# Patient Record
Sex: MALE | Race: WHITE | NOT HISPANIC OR LATINO | URBAN - METROPOLITAN AREA
[De-identification: names, ages, dates, MRNs, and addresses within clinical notes are randomized per-mention and may not be internally consistent; named-entity substitution may affect disease eponyms.]

---

## 2017-07-17 ENCOUNTER — EMERGENCY (EMERGENCY)
Facility: HOSPITAL | Age: 22
LOS: 1 days | Discharge: HOME | End: 2017-07-17
Attending: EMERGENCY MEDICINE | Admitting: EMERGENCY MEDICINE
Payer: COMMERCIAL

## 2017-07-17 VITALS
OXYGEN SATURATION: 100 % | TEMPERATURE: 98 F | DIASTOLIC BLOOD PRESSURE: 83 MMHG | RESPIRATION RATE: 18 BRPM | SYSTOLIC BLOOD PRESSURE: 127 MMHG | HEART RATE: 84 BPM

## 2017-07-17 DIAGNOSIS — G43.909 MIGRAINE, UNSPECIFIED, NOT INTRACTABLE, WITHOUT STATUS MIGRAINOSUS: ICD-10-CM

## 2017-07-17 DIAGNOSIS — R51 HEADACHE: ICD-10-CM

## 2017-07-17 LAB
ALBUMIN SERPL ELPH-MCNC: 4.5 G/DL — SIGNIFICANT CHANGE UP (ref 3.4–5)
ALP SERPL-CCNC: 60 U/L — SIGNIFICANT CHANGE UP (ref 40–120)
ALT FLD-CCNC: 26 U/L — SIGNIFICANT CHANGE UP (ref 12–42)
ANION GAP SERPL CALC-SCNC: 6 MMOL/L — LOW (ref 9–16)
APPEARANCE UR: CLEAR — SIGNIFICANT CHANGE UP
AST SERPL-CCNC: 17 U/L — SIGNIFICANT CHANGE UP (ref 15–37)
BASOPHILS NFR BLD AUTO: 0.3 % — SIGNIFICANT CHANGE UP (ref 0–2)
BILIRUB SERPL-MCNC: 0.7 MG/DL — SIGNIFICANT CHANGE UP (ref 0.2–1.2)
BILIRUB UR-MCNC: NEGATIVE — SIGNIFICANT CHANGE UP
BUN SERPL-MCNC: 9 MG/DL — SIGNIFICANT CHANGE UP (ref 7–23)
CALCIUM SERPL-MCNC: 9.7 MG/DL — SIGNIFICANT CHANGE UP (ref 8.5–10.5)
CHLORIDE SERPL-SCNC: 105 MMOL/L — SIGNIFICANT CHANGE UP (ref 96–108)
CO2 SERPL-SCNC: 28 MMOL/L — SIGNIFICANT CHANGE UP (ref 22–31)
COLOR SPEC: YELLOW — SIGNIFICANT CHANGE UP
CREAT SERPL-MCNC: 0.89 MG/DL — SIGNIFICANT CHANGE UP (ref 0.5–1.3)
DIFF PNL FLD: NEGATIVE — SIGNIFICANT CHANGE UP
EOSINOPHIL NFR BLD AUTO: 0.6 % — SIGNIFICANT CHANGE UP (ref 0–6)
FLUAV SPEC QL CULT: NEGATIVE — SIGNIFICANT CHANGE UP
FLUBV AG SPEC QL IA: NEGATIVE — SIGNIFICANT CHANGE UP
GLUCOSE SERPL-MCNC: 101 MG/DL — HIGH (ref 70–99)
GLUCOSE UR QL: NEGATIVE — SIGNIFICANT CHANGE UP
HCT VFR BLD CALC: 43.2 % — SIGNIFICANT CHANGE UP (ref 39–50)
HGB BLD-MCNC: 16 G/DL — SIGNIFICANT CHANGE UP (ref 13–17)
IMM GRANULOCYTES NFR BLD AUTO: 0.4 % — SIGNIFICANT CHANGE UP (ref 0–1.5)
KETONES UR-MCNC: (no result) MG/DL
LEUKOCYTE ESTERASE UR-ACNC: NEGATIVE — SIGNIFICANT CHANGE UP
LYMPHOCYTES # BLD AUTO: 13.7 % — SIGNIFICANT CHANGE UP (ref 13–44)
MAGNESIUM SERPL-MCNC: 1.8 MG/DL — SIGNIFICANT CHANGE UP (ref 1.6–2.6)
MCHC RBC-ENTMCNC: 32.9 PG — SIGNIFICANT CHANGE UP (ref 27–34)
MCHC RBC-ENTMCNC: 37 G/DL — HIGH (ref 32–36)
MCV RBC AUTO: 88.7 FL — SIGNIFICANT CHANGE UP (ref 80–100)
MONOCYTES NFR BLD AUTO: 8.3 % — SIGNIFICANT CHANGE UP (ref 2–14)
NEUTROPHILS NFR BLD AUTO: 76.7 % — SIGNIFICANT CHANGE UP (ref 43–77)
NITRITE UR-MCNC: NEGATIVE — SIGNIFICANT CHANGE UP
PH UR: 7 — SIGNIFICANT CHANGE UP (ref 5–8)
PLATELET # BLD AUTO: 162 K/UL — SIGNIFICANT CHANGE UP (ref 150–400)
POTASSIUM SERPL-MCNC: 3.5 MMOL/L — SIGNIFICANT CHANGE UP (ref 3.5–5.3)
POTASSIUM SERPL-SCNC: 3.5 MMOL/L — SIGNIFICANT CHANGE UP (ref 3.5–5.3)
PROT SERPL-MCNC: 8.1 G/DL — SIGNIFICANT CHANGE UP (ref 6.4–8.2)
PROT UR-MCNC: NEGATIVE MG/DL — SIGNIFICANT CHANGE UP
RBC # BLD: 4.87 M/UL — SIGNIFICANT CHANGE UP (ref 4.2–5.8)
RBC # FLD: 11.9 % — SIGNIFICANT CHANGE UP (ref 10.3–16.9)
SODIUM SERPL-SCNC: 139 MMOL/L — SIGNIFICANT CHANGE UP (ref 132–145)
SP GR SPEC: 1.02 — SIGNIFICANT CHANGE UP (ref 1–1.03)
UROBILINOGEN FLD QL: 1 E.U./DL — SIGNIFICANT CHANGE UP
WBC # BLD: 11.7 K/UL — HIGH (ref 3.8–10.5)
WBC # FLD AUTO: 11.7 K/UL — HIGH (ref 3.8–10.5)

## 2017-07-17 PROCEDURE — 70450 CT HEAD/BRAIN W/O DYE: CPT | Mod: 26

## 2017-07-17 PROCEDURE — 99285 EMERGENCY DEPT VISIT HI MDM: CPT

## 2017-07-17 RX ORDER — ONDANSETRON 8 MG/1
4 TABLET, FILM COATED ORAL ONCE
Qty: 0 | Refills: 0 | Status: COMPLETED | OUTPATIENT
Start: 2017-07-17 | End: 2017-07-17

## 2017-07-17 RX ORDER — ONDANSETRON 8 MG/1
1 TABLET, FILM COATED ORAL
Qty: 30 | Refills: 0 | OUTPATIENT
Start: 2017-07-17

## 2017-07-17 RX ORDER — KETOROLAC TROMETHAMINE 30 MG/ML
30 SYRINGE (ML) INJECTION ONCE
Qty: 0 | Refills: 0 | Status: DISCONTINUED | OUTPATIENT
Start: 2017-07-17 | End: 2017-07-17

## 2017-07-17 RX ORDER — METOCLOPRAMIDE HCL 10 MG
1 TABLET ORAL
Qty: 30 | Refills: 0 | OUTPATIENT
Start: 2017-07-17

## 2017-07-17 RX ORDER — DEXAMETHASONE 0.5 MG/5ML
10 ELIXIR ORAL ONCE
Qty: 0 | Refills: 0 | Status: COMPLETED | OUTPATIENT
Start: 2017-07-17 | End: 2017-07-17

## 2017-07-17 RX ORDER — SODIUM CHLORIDE 9 MG/ML
2000 INJECTION INTRAMUSCULAR; INTRAVENOUS; SUBCUTANEOUS ONCE
Qty: 0 | Refills: 0 | Status: COMPLETED | OUTPATIENT
Start: 2017-07-17 | End: 2017-07-17

## 2017-07-17 RX ORDER — METOCLOPRAMIDE HCL 10 MG
10 TABLET ORAL ONCE
Qty: 0 | Refills: 0 | Status: COMPLETED | OUTPATIENT
Start: 2017-07-17 | End: 2017-07-17

## 2017-07-17 RX ADMIN — Medication 10 MILLIGRAM(S): at 14:33

## 2017-07-17 RX ADMIN — ONDANSETRON 4 MILLIGRAM(S): 8 TABLET, FILM COATED ORAL at 14:33

## 2017-07-17 RX ADMIN — SODIUM CHLORIDE 2000 MILLILITER(S): 9 INJECTION INTRAMUSCULAR; INTRAVENOUS; SUBCUTANEOUS at 15:37

## 2017-07-17 NOTE — ED ADULT TRIAGE NOTE - CHIEF COMPLAINT QUOTE
Pt sent from [facility] for "worst headache of his life." Pt does have history of migraines but has not had one in 10 years. Pt is sensitive to light and complaining of feelings of nausea

## 2017-07-17 NOTE — ED PROVIDER NOTE - OBJECTIVE STATEMENT
23 yo M with a hx of migraines comes from work to the ED c/o migraine since yesterday. Pt states he woke up to a migraine and nausea yesterday morning. Took Excedrin like he normally does when he has a migraine and felt better for a couple of hours. Went to bed and woke up today with worse migraine. Pt states his usual migraines comes with N/V, light sensitivity and b/l throbbing HA at the temples of the head. Pt states he is experiencing all those symptoms, and has blurry vision in b/l eyes with this episode. Describes blurry vision as "fuzzy patches." Denies having blurry vision with past migraine epsiodes. Notes he had not eaten much, but had vomiting 3x this morning. States this episode feels worse and longer in duration than usual. Denies heavy drinking this past weekend and denies any caffeine intake. Pt states he lives on [address], and does not live near [facility]. Denies hiking this summer. No sick contacts. Pt's uncle has hx of aneurysm.

## 2017-07-17 NOTE — ED ADULT NURSE NOTE - OBJECTIVE STATEMENT
Pt sent from [facility] for h/a w/ n/v since yesterday and today w/ visual disturbance. States stopped drinking etoh 1 mth ago. Axox3 and denies any cp, sob, but appears in pain.

## 2017-07-17 NOTE — ED PROVIDER NOTE - CONSTITUTIONAL, MLM
normal... Awake, alert, oriented to person, place, time/situation and in mild and moderate distress. Pt is lying down with eyes closed in a dark room

## 2017-07-17 NOTE — ED PROVIDER NOTE - PROGRESS NOTE DETAILS
As per nurse, pt is feeling much better and symptom has improved. Pt's sx have resolved and he would like to go, will d/c. labs and CT head wnl. Will d/c with Reglan Rx.

## 2017-07-17 NOTE — ED PROVIDER NOTE - MEDICAL DECISION MAKING DETAILS
Patient presenting with headache most consistent with migraine- n/v maybe be related or parallel AGE. Will check labs and CT head and send Lyme.  Migraine meds here with Decadron and fluid bolus.

## 2017-07-17 NOTE — ED PROVIDER NOTE - EYES, MLM
Pupils 5mm bilaterally, Sclera clear,  pupils equal, round and reactive to light, EOMI, slightly blurry vision in the left eye.

## 2017-07-18 LAB
B BURGDOR C6 AB SER-ACNC: NEGATIVE — SIGNIFICANT CHANGE UP
B BURGDOR IGG+IGM SER-ACNC: 0.4 INDEX — SIGNIFICANT CHANGE UP (ref 0.01–0.89)

## 2018-03-21 ENCOUNTER — EMERGENCY (EMERGENCY)
Facility: HOSPITAL | Age: 23
LOS: 1 days | Discharge: HOME | End: 2018-03-21
Attending: EMERGENCY MEDICINE | Admitting: EMERGENCY MEDICINE
Payer: COMMERCIAL

## 2018-03-21 VITALS
RESPIRATION RATE: 18 BRPM | DIASTOLIC BLOOD PRESSURE: 91 MMHG | HEART RATE: 103 BPM | TEMPERATURE: 99 F | SYSTOLIC BLOOD PRESSURE: 146 MMHG | WEIGHT: 199.96 LBS | OXYGEN SATURATION: 98 % | HEIGHT: 69 IN

## 2018-03-21 PROCEDURE — 99283 EMERGENCY DEPT VISIT LOW MDM: CPT

## 2018-03-21 NOTE — ED PROVIDER NOTE - PLAN OF CARE
1. Take ofloxacin eye drops, one drop in the right eye four times a day. Use erythromycin ointment, 1 1 strip on the right lower lid at bedtime.   Follow up with ophthalmology clinic tomorrow morning at 9 AM. [phone]. Information provided.   2. Follow up with your PMD within 24-48 hours.   3. Return to ED if worsening vision, increased pain, acute loss of vision, or any other concerning symptoms. R

## 2018-03-21 NOTE — ED PROVIDER NOTE - PROGRESS NOTE DETAILS
Pt able to open right eye fully, states feeling better, vision less blurry than initially. Visual acuity right eye improved to 20/30 after dominick lens irrigation. -Astrid Pitts PA-C Pt able to open right eye fully, states feeling better, vision less blurry than initially (OD 20/50 -- > 20/30). Visual acuity right eye improved to 20/30 after dominick lens irrigation. -Astrid Pitts PA-C Consulted ophtho and tox.  Recommended d/c after irrigation assuming normal pH and no significant corneal injury.  Ophtho rec oflox gtt and erythromycin ointment.  Ophtho is able to see patient in the AM.  Strict return precautions provided to patient.  --BMM

## 2018-03-21 NOTE — ED PROVIDER NOTE - EYE EXAM METHOD
no corneal abrasions noted on fluorescein stain/fluorescein/slit lamp conjunctiva injected right eye, no corneal abrasions noted on fluorescein stain/fluorescein/slit lamp

## 2018-03-21 NOTE — ED PROVIDER NOTE - OBJECTIVE STATEMENT
23 Yo male previously healthy, presents to ED c/o left eye injury 45 minutes ago. Pt states he is  cleaning with [employer]  ([facility] [place]# [id number])and accidental splash into left eye. Pt states immediate pain and burning sensation, washed out with 23 Yo male previously healthy, presents to ED c/o left eye injury 45 minutes ago. Pt states he is  cleaning with [employer]  ([facility]# [id number])and accidental splash into left eye. Pt states immediate pain and burning sensation, washed out at firehouse with eye irrigation. Pt notes cleaning solution was not diluted at the time. Pt unable to open eye. Admits photophobia, foreign body sensation, pain, blurry vision. 21 Yo male previously healthy, presents to ED c/o right eye injury 45 minutes ago. Pt states he is  and while cleaning with [employer]  ([facility] [place]# [id number])and accidental splash into right eye. Pt states immediate pain and burning sensation, washed out at firehouse with eye irrigation. Pt notes cleaning solution was not diluted at the time. Pt unable to open eye. Admits photophobia, foreign body sensation, pain, blurry vision. 21 Yo male previously healthy, presents to ED c/o right eye injury 45 minutes ago. Pt states he is  and while cleaning with [employer]  ([facility]# [id number]), had accidental splash into right eye. Pt states immediate pain and burning sensation, washed out at firehouse with eye irrigation. Pt notes cleaning solution was not diluted at the time. Pt unable to open eye. Admits photophobia, foreign body sensation, pain, blurry vision.

## 2018-03-21 NOTE — ED PROVIDER NOTE - NOTES
consulted with toxicology,  appropriate management and  treatment with dominick lens irrigation followed, recommended Optho follow up. Recommend ofloxacin gtts QID OS, erythromycin ointment QHS OS and follow up in clinic tomorrow morning.

## 2018-03-21 NOTE — ED PROVIDER NOTE - EYES [+], MLM
FB sensation, blurry vision/VISUAL CHANGES FB sensation, blurry vision, redness of right eye/VISUAL CHANGES

## 2018-03-21 NOTE — ED ADULT NURSE NOTE - ADDITIONAL PRINTED INSTRUCTIONS GIVEN
pt d/c no visual losses noted and pt s/p dominick lenses and eye drips and anti biotics ointment to followup with opthalmology or is pmd

## 2018-03-21 NOTE — ED PROVIDER NOTE - MEDICAL DECISION MAKING DETAILS
Corneal injury c chemical .  Reduced vision, corneal injection, minimal conj erythema, no significant swelling.  pH 7-8.  Irrigate, re-examine, s/w tox and ophtho.  --BMM

## 2018-06-25 NOTE — ED PROVIDER NOTE - EXITCARE/DISCHARGE INSTRUCTIONS
Clear bilaterally, pupils equal, round and reactive to light. EOMI
Launch [employer] and print the 'Prescriptions from this Visit' Report

## 2018-07-03 ENCOUNTER — EMERGENCY (EMERGENCY)
Facility: HOSPITAL | Age: 23
LOS: 1 days | Discharge: HOME | End: 2018-07-03
Attending: EMERGENCY MEDICINE
Payer: COMMERCIAL

## 2018-07-03 VITALS
SYSTOLIC BLOOD PRESSURE: 147 MMHG | RESPIRATION RATE: 20 BRPM | HEART RATE: 96 BPM | DIASTOLIC BLOOD PRESSURE: 82 MMHG | OXYGEN SATURATION: 97 % | TEMPERATURE: 98 F

## 2018-07-03 VITALS
DIASTOLIC BLOOD PRESSURE: 92 MMHG | RESPIRATION RATE: 18 BRPM | SYSTOLIC BLOOD PRESSURE: 133 MMHG | OXYGEN SATURATION: 98 % | HEART RATE: 84 BPM | TEMPERATURE: 98 F

## 2018-07-03 LAB
ALBUMIN SERPL ELPH-MCNC: 3.6 G/DL — SIGNIFICANT CHANGE UP (ref 3.3–5)
ALP SERPL-CCNC: 56 U/L — SIGNIFICANT CHANGE UP (ref 40–120)
ALT FLD-CCNC: 24 U/L — SIGNIFICANT CHANGE UP (ref 10–45)
ANION GAP SERPL CALC-SCNC: 14 MMOL/L — SIGNIFICANT CHANGE UP (ref 5–17)
AST SERPL-CCNC: 19 U/L — SIGNIFICANT CHANGE UP (ref 10–40)
BASE EXCESS BLDA CALC-SCNC: 0.1 MMOL/L — SIGNIFICANT CHANGE UP (ref -2–2)
BASOPHILS # BLD AUTO: 0 K/UL — SIGNIFICANT CHANGE UP (ref 0–0.2)
BASOPHILS NFR BLD AUTO: 0.5 % — SIGNIFICANT CHANGE UP (ref 0–2)
BILIRUB SERPL-MCNC: 0.5 MG/DL — SIGNIFICANT CHANGE UP (ref 0.2–1.2)
BUN SERPL-MCNC: 12 MG/DL — SIGNIFICANT CHANGE UP (ref 7–23)
CALCIUM SERPL-MCNC: 8.7 MG/DL — SIGNIFICANT CHANGE UP (ref 8.4–10.5)
CHLORIDE SERPL-SCNC: 107 MMOL/L — SIGNIFICANT CHANGE UP (ref 96–108)
CO2 BLDA-SCNC: 25 MMOL/L — SIGNIFICANT CHANGE UP (ref 22–30)
CO2 SERPL-SCNC: 17 MMOL/L — LOW (ref 22–31)
COHGB MFR BLDA: 0.9 % — SIGNIFICANT CHANGE UP (ref 0–1.5)
CREAT SERPL-MCNC: 0.9 MG/DL — SIGNIFICANT CHANGE UP (ref 0.5–1.3)
EOSINOPHIL # BLD AUTO: 0.4 K/UL — SIGNIFICANT CHANGE UP (ref 0–0.5)
EOSINOPHIL NFR BLD AUTO: 4.4 % — SIGNIFICANT CHANGE UP (ref 0–6)
GAS PNL BLDA: SIGNIFICANT CHANGE UP
GLUCOSE SERPL-MCNC: 94 MG/DL — SIGNIFICANT CHANGE UP (ref 70–99)
HCO3 BLDA-SCNC: 24 MMOL/L — SIGNIFICANT CHANGE UP (ref 21–29)
HCT VFR BLD CALC: 41.7 % — SIGNIFICANT CHANGE UP (ref 39–50)
HGB BLD-MCNC: 15 G/DL — SIGNIFICANT CHANGE UP (ref 13–17)
HGB BLDA-MCNC: 15.3 G/DL — SIGNIFICANT CHANGE UP (ref 13.1–17.1)
HOROWITZ INDEX BLDA+IHG-RTO: SIGNIFICANT CHANGE UP
LYMPHOCYTES # BLD AUTO: 2.6 K/UL — SIGNIFICANT CHANGE UP (ref 1–3.3)
LYMPHOCYTES # BLD AUTO: 25.8 % — SIGNIFICANT CHANGE UP (ref 13–44)
MCHC RBC-ENTMCNC: 34 PG — SIGNIFICANT CHANGE UP (ref 27–34)
MCHC RBC-ENTMCNC: 36 GM/DL — SIGNIFICANT CHANGE UP (ref 32–36)
MCV RBC AUTO: 94.3 FL — SIGNIFICANT CHANGE UP (ref 80–100)
METHGB MFR BLDA: 0.9 % — SIGNIFICANT CHANGE UP (ref 0–15)
MONOCYTES # BLD AUTO: 0.9 K/UL — SIGNIFICANT CHANGE UP (ref 0–0.9)
MONOCYTES NFR BLD AUTO: 9.2 % — SIGNIFICANT CHANGE UP (ref 2–14)
NEUTROPHILS # BLD AUTO: 6 K/UL — SIGNIFICANT CHANGE UP (ref 1.8–7.4)
NEUTROPHILS NFR BLD AUTO: 60.1 % — SIGNIFICANT CHANGE UP (ref 43–77)
O2 CT VFR BLDA CALC: 21 ML/DL — SIGNIFICANT CHANGE UP (ref 18–22)
OXYHGB MFR BLDA: 97 % — SIGNIFICANT CHANGE UP (ref 94–98)
PCO2 BLDA: 38 MMHG — SIGNIFICANT CHANGE UP (ref 32–46)
PH BLDA: 7.42 — SIGNIFICANT CHANGE UP (ref 7.35–7.45)
PLATELET # BLD AUTO: 170 K/UL — SIGNIFICANT CHANGE UP (ref 150–400)
PO2 BLDA: 108 MMHG — SIGNIFICANT CHANGE UP (ref 74–108)
POTASSIUM SERPL-MCNC: 4 MMOL/L — SIGNIFICANT CHANGE UP (ref 3.5–5.3)
POTASSIUM SERPL-SCNC: 4 MMOL/L — SIGNIFICANT CHANGE UP (ref 3.5–5.3)
PROT SERPL-MCNC: 6 G/DL — SIGNIFICANT CHANGE UP (ref 6–8.3)
RBC # BLD: 4.42 M/UL — SIGNIFICANT CHANGE UP (ref 4.2–5.8)
RBC # FLD: 11.2 % — SIGNIFICANT CHANGE UP (ref 10.3–14.5)
SAO2 % BLDA: 99 % — HIGH (ref 92–96)
SODIUM SERPL-SCNC: 138 MMOL/L — SIGNIFICANT CHANGE UP (ref 135–145)
WBC # BLD: 10 K/UL — SIGNIFICANT CHANGE UP (ref 3.8–10.5)
WBC # FLD AUTO: 10 K/UL — SIGNIFICANT CHANGE UP (ref 3.8–10.5)

## 2018-07-03 PROCEDURE — 93010 ELECTROCARDIOGRAM REPORT: CPT

## 2018-07-03 PROCEDURE — 99283 EMERGENCY DEPT VISIT LOW MDM: CPT

## 2018-07-03 PROCEDURE — 99053 MED SERV 10PM-8AM 24 HR FAC: CPT

## 2018-07-03 PROCEDURE — 99284 EMERGENCY DEPT VISIT MOD MDM: CPT | Mod: 25

## 2018-07-03 PROCEDURE — 93005 ELECTROCARDIOGRAM TRACING: CPT

## 2018-07-03 PROCEDURE — 82805 BLOOD GASES W/O2 SATURATION: CPT

## 2018-07-03 PROCEDURE — 85027 COMPLETE CBC AUTOMATED: CPT

## 2018-07-03 PROCEDURE — 80053 COMPREHEN METABOLIC PANEL: CPT

## 2018-07-03 RX ORDER — SODIUM CHLORIDE 9 MG/ML
1000 INJECTION INTRAMUSCULAR; INTRAVENOUS; SUBCUTANEOUS ONCE
Refills: 0 | Status: COMPLETED | OUTPATIENT
Start: 2018-07-03 | End: 2018-07-03

## 2018-07-03 RX ORDER — FAMOTIDINE 10 MG/ML
20 INJECTION INTRAVENOUS ONCE
Refills: 0 | Status: COMPLETED | OUTPATIENT
Start: 2018-07-03 | End: 2018-07-03

## 2018-07-03 RX ADMIN — SODIUM CHLORIDE 1000 MILLILITER(S): 9 INJECTION INTRAMUSCULAR; INTRAVENOUS; SUBCUTANEOUS at 04:22

## 2018-07-03 RX ADMIN — FAMOTIDINE 20 MILLIGRAM(S): 10 INJECTION INTRAVENOUS at 04:22

## 2018-07-03 NOTE — ED PROVIDER NOTE - PHYSICAL EXAMINATION
Attending MD Wright: On exam, NAD, head NCAT, PERRL, FROM at neck, no tenderness to palpation or stepoffs along length of spine, lungs CTAB with good inspiratory effort, +S1S2, no m/r/g, abdomen soft with +BS, NT, ND, no CVAT, moving all extremities with 5/5 strength bilateral upper and lower extremities, good and equal  strength bilaterally

## 2018-07-03 NOTE — ED PROVIDER NOTE - PROGRESS NOTE DETAILS
Elizabeth Goldberger PGY-2: labs unrem, pt feeling well. Stable for dc Elizabeth Goldberger PGY-2: labs unrem, pt feeling well. EKG unrem. Stable for dc

## 2018-07-03 NOTE — ED PROVIDER NOTE - OBJECTIVE STATEMENT
23m no PMH here after episode lightheadedness. 23m no PMH here after episode lightheadedness. Pt works as . States that he went for a run earlier today, subsequently got called to a building for an alarming CO detector; was dressed in full protective gear and was walking upstairs when suddenly felt lightheaded. Also had nausea and 3 episodes of vomiting. 23m no PMH here after episode lightheadedness. Pt works as . States that he went for a run earlier today, subsequently got called to a building for an alarming CO detector; was dressed in full protective gear and was walking upstairs when suddenly felt lightheaded. No actual syncope. Also had nausea and 3 episodes of vomiting. States did not drink much water today in spite of high temp outside. CO level on premises was only slightly higher than normal accdg to their measurements. Never had cp or SOB. Was given fluids and zofran in field, now feels back to baseline w/o sx. 23m no PMH here after episode lightheadedness. Pt works as . States that he went for a run earlier today, subsequently got called to a building for an alarming CO detector; was dressed in full protective gear and was walking upstairs when suddenly felt lightheaded. No actual syncope. Also had nausea and 3 episodes of vomiting. States did not drink much water today in spite of high temp outside. CO level on premises was only slightly higher than normal according to their measurements. Never had cp or SOB. Was given fluids and zofran in field, now feels back to baseline w/o sx.    Attending MD Wright: Patient interviewed with resident, agree with above, PMH GERD, denies PSH, meds, Reports allergy to codeine (GI upset).  Denies tobacco, drugs.

## 2018-07-03 NOTE — ED PROVIDER NOTE - CARE PLAN
Principal Discharge DX:	Lightheadedness Principal Discharge DX:	Lightheadedness  Assessment and plan of treatment:	You were seen in the emergency department for lightheadedness. Please follow up with your primary doctor in the next 5-7 days. Make sure to drink plenty of fluids. Return to the emergency department immediately if you experience chest pain, difficulty breathing, or any other concerning symptoms.

## 2018-07-03 NOTE — ED PROVIDER NOTE - ATTENDING CONTRIBUTION TO CARE
Attending MD Wright: I personally have seen and examined this patient.  Resident note reviewed and agree on plan of care and except where noted.  See below for details.

## 2018-07-03 NOTE — ED PROVIDER NOTE - PLAN OF CARE
You were seen in the emergency department for lightheadedness. Please follow up with your primary doctor in the next 5-7 days. Make sure to drink plenty of fluids. Return to the emergency department immediately if you experience chest pain, difficulty breathing, or any other concerning symptoms.

## 2018-07-03 NOTE — ED ADULT NURSE NOTE - OBJECTIVE STATEMENT
24 y/o male presented to the ED s/p "dehydration episode" at work tonight. pt was brought in by EMS from his job as . pt was working a job site for CO2 leak with all his equipment on for an 1/2 hour and started to feel nauseas and vomited 3-4 times with light headed ness. EMS placed 18G in L AC, pt  received 500ml NS and 4MG Zofran IV. EMS stated that patient clothes were soaked with sweat. 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 pt denies any medical or surgical history.  chief next to bedside. comfort measures in place. awaiting MD dispo.

## 2018-07-03 NOTE — ED PROVIDER NOTE - MEDICAL DECISION MAKING DETAILS
23m  here for episode lightheadedness. Likely 2/2 dehydration in setting of warm weather and exertion w limited hydration, however cannot r/o CO poisoning. Will check basics and coox, reassesss

## 2020-01-26 NOTE — ED PROVIDER NOTE - PRIOR EKG STATUS
Patient with one or more new problems requiring additional work-up/treatment.
there is no prior EKG available for comparison

## 2020-08-27 ENCOUNTER — EMERGENCY (EMERGENCY)
Facility: HOSPITAL | Age: 25
LOS: 1 days | Discharge: HOME | End: 2020-08-27
Attending: EMERGENCY MEDICINE
Payer: COMMERCIAL

## 2020-08-27 VITALS
TEMPERATURE: 97 F | RESPIRATION RATE: 20 BRPM | WEIGHT: 210.1 LBS | OXYGEN SATURATION: 96 % | HEIGHT: 67 IN | HEART RATE: 106 BPM | SYSTOLIC BLOOD PRESSURE: 150 MMHG | DIASTOLIC BLOOD PRESSURE: 90 MMHG

## 2020-08-27 LAB
ALBUMIN SERPL ELPH-MCNC: 4.6 G/DL — SIGNIFICANT CHANGE UP (ref 3.3–5)
ALP SERPL-CCNC: 73 U/L — SIGNIFICANT CHANGE UP (ref 40–120)
ALT FLD-CCNC: 22 U/L — SIGNIFICANT CHANGE UP (ref 10–45)
APTT BLD: 32.6 SEC — SIGNIFICANT CHANGE UP (ref 27.5–35.5)
AST SERPL-CCNC: 15 U/L — SIGNIFICANT CHANGE UP (ref 10–40)
BASOPHILS # BLD AUTO: 0.06 K/UL — SIGNIFICANT CHANGE UP (ref 0–0.2)
BASOPHILS NFR BLD AUTO: 0.7 % — SIGNIFICANT CHANGE UP (ref 0–2)
BILIRUB SERPL-MCNC: 0.4 MG/DL — SIGNIFICANT CHANGE UP (ref 0.2–1.2)
BUN SERPL-MCNC: 16 MG/DL — SIGNIFICANT CHANGE UP (ref 7–23)
CALCIUM SERPL-MCNC: 9.7 MG/DL — SIGNIFICANT CHANGE UP (ref 8.4–10.5)
CO2 SERPL-SCNC: 24 MMOL/L — SIGNIFICANT CHANGE UP (ref 22–31)
CREAT SERPL-MCNC: 0.97 MG/DL — SIGNIFICANT CHANGE UP (ref 0.5–1.3)
EOSINOPHIL # BLD AUTO: 0.49 K/UL — SIGNIFICANT CHANGE UP (ref 0–0.5)
EOSINOPHIL NFR BLD AUTO: 5.5 % — SIGNIFICANT CHANGE UP (ref 0–6)
GLUCOSE SERPL-MCNC: 115 MG/DL — HIGH (ref 70–99)
HCT VFR BLD CALC: 46.1 % — SIGNIFICANT CHANGE UP (ref 39–50)
HGB BLD-MCNC: 16.5 G/DL — SIGNIFICANT CHANGE UP (ref 13–17)
IMM GRANULOCYTES NFR BLD AUTO: 0.9 % — SIGNIFICANT CHANGE UP (ref 0–1.5)
INR BLD: 1.07 RATIO — SIGNIFICANT CHANGE UP (ref 0.88–1.16)
LYMPHOCYTES # BLD AUTO: 2.88 K/UL — SIGNIFICANT CHANGE UP (ref 1–3.3)
LYMPHOCYTES # BLD AUTO: 32.4 % — SIGNIFICANT CHANGE UP (ref 13–44)
MCHC RBC-ENTMCNC: 32.1 PG — SIGNIFICANT CHANGE UP (ref 27–34)
MCHC RBC-ENTMCNC: 35.8 GM/DL — SIGNIFICANT CHANGE UP (ref 32–36)
MCV RBC AUTO: 89.7 FL — SIGNIFICANT CHANGE UP (ref 80–100)
MONOCYTES # BLD AUTO: 0.76 K/UL — SIGNIFICANT CHANGE UP (ref 0–0.9)
MONOCYTES NFR BLD AUTO: 8.5 % — SIGNIFICANT CHANGE UP (ref 2–14)
NEUTROPHILS # BLD AUTO: 4.62 K/UL — SIGNIFICANT CHANGE UP (ref 1.8–7.4)
NEUTROPHILS NFR BLD AUTO: 52 % — SIGNIFICANT CHANGE UP (ref 43–77)
NRBC # BLD: 0 /100 WBCS — SIGNIFICANT CHANGE UP (ref 0–0)
PLATELET # BLD AUTO: 185 K/UL — SIGNIFICANT CHANGE UP (ref 150–400)
PROT SERPL-MCNC: 6.9 G/DL — SIGNIFICANT CHANGE UP (ref 6–8.3)
PROTHROM AB SERPL-ACNC: 12.7 SEC — SIGNIFICANT CHANGE UP (ref 10.6–13.6)
RBC # BLD: 5.14 M/UL — SIGNIFICANT CHANGE UP (ref 4.2–5.8)
RBC # FLD: 11.9 % — SIGNIFICANT CHANGE UP (ref 10.3–14.5)
WBC # BLD: 8.89 K/UL — SIGNIFICANT CHANGE UP (ref 3.8–10.5)
WBC # FLD AUTO: 8.89 K/UL — SIGNIFICANT CHANGE UP (ref 3.8–10.5)

## 2020-08-27 PROCEDURE — 99285 EMERGENCY DEPT VISIT HI MDM: CPT

## 2020-08-27 PROCEDURE — 85027 COMPLETE CBC AUTOMATED: CPT

## 2020-08-27 PROCEDURE — 80053 COMPREHEN METABOLIC PANEL: CPT

## 2020-08-27 PROCEDURE — 70450 CT HEAD/BRAIN W/O DYE: CPT

## 2020-08-27 PROCEDURE — 99053 MED SERV 10PM-8AM 24 HR FAC: CPT

## 2020-08-27 PROCEDURE — 85730 THROMBOPLASTIN TIME PARTIAL: CPT

## 2020-08-27 PROCEDURE — 96374 THER/PROPH/DIAG INJ IV PUSH: CPT

## 2020-08-27 PROCEDURE — 99284 EMERGENCY DEPT VISIT MOD MDM: CPT | Mod: 25

## 2020-08-27 PROCEDURE — 70450 CT HEAD/BRAIN W/O DYE: CPT | Mod: 26

## 2020-08-27 PROCEDURE — 85610 PROTHROMBIN TIME: CPT

## 2020-08-27 RX ORDER — METOCLOPRAMIDE HCL 10 MG
10 TABLET ORAL ONCE
Refills: 0 | Status: COMPLETED | OUTPATIENT
Start: 2020-08-27 | End: 2020-08-27

## 2020-08-27 RX ORDER — SODIUM CHLORIDE 9 MG/ML
1000 INJECTION INTRAMUSCULAR; INTRAVENOUS; SUBCUTANEOUS ONCE
Refills: 0 | Status: COMPLETED | OUTPATIENT
Start: 2020-08-27 | End: 2020-08-27

## 2020-08-27 RX ORDER — KETOROLAC TROMETHAMINE 30 MG/ML
15 SYRINGE (ML) INJECTION ONCE
Refills: 0 | Status: DISCONTINUED | OUTPATIENT
Start: 2020-08-27 | End: 2020-08-27

## 2020-08-27 RX ADMIN — Medication 10 MILLIGRAM(S): at 23:18

## 2020-08-27 RX ADMIN — SODIUM CHLORIDE 1000 MILLILITER(S): 9 INJECTION INTRAMUSCULAR; INTRAVENOUS; SUBCUTANEOUS at 23:18

## 2020-08-27 NOTE — ED PROVIDER NOTE - PROGRESS NOTE DETAILS
Resident Rosa: pt re-evaluated clinically, demonstrating marked improvement in presenting headache. Vital Signs Stable. Repeat neuro exam remains normal without any significant abnormalities or suggestive of any acute pathology. CT Head without any evidence of ICH. Pt is tolerating PO intake, without any nausea/vomiting - will discharge home with followup care.

## 2020-08-27 NOTE — ED ADULT NURSE NOTE - OBJECTIVE STATEMENT
The pt is a 26 y/o M PMH migraines, right-sided cyst presenting to the ED c/o right-sided temporal pain, danita blurry vision, 2 episodes of vomiting. The patient reports Upon assessment, pt is AO x 4, speaking in full and complete sentences, s1 s2 heart sounds, abd soft and nontender, bowel sounds present in all four quadrants, equal strength bilaterally, skin warm, dry and intact, present peripheral pulses, PERRL. Pt denies fever, chills, n/v, urinary symptoms, CP, dizziness, weakness, HA, SOB, abd pain. Pt on cardiac monitor, appropriate side rails raised, wheels locked, bed in lowest position, pt denies needs at this time. The pt is a 26 y/o M PMH migraines, right-sided cyst presenting to the ED c/o right-sided temporal pain, danita blurry vision, 2 episodes of vomiting. 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 Pt denies fever, chills, urinary symptoms, CP, dizziness, weakness, SOB, abd pain. Pt NSR on cardiac monitor, appropriate side rails raised, wheels locked, bed in lowest position, pt denies needs at this time.

## 2020-08-27 NOTE — ED PROVIDER NOTE - NS ED ROS FT
Constitution: No Fever or chills, No Weight Loss,   Eyes: (+) blurry vision  HEENT: No cough, No Discharge, No Rhinorrhea, No URI symptoms  Cardio: No Chest pain, No Palpitations, No Dyspnea  Resp: No SOB, No Wheezing  GI: No abdominal pain, (+) Nausea, (+) Vomiting, No Constipation, No Diarrhea  : No burning upon urination, trouble urinating, no foul odor from urine  MSK: No Back pain, No Numbness, No Tingling, No Weakness  Neuro: (+) Headache, No changes to Vision, No changes to Hearing, Normal Gait  Skin: No rashes, No Bruising, No Swelling

## 2020-08-27 NOTE — ED PROVIDER NOTE - NSFOLLOWUPINSTRUCTIONS_ED_ALL_ED_FT
You were seen and evaluated in the Emergency Department for your headache.     Clinical examination and history demonstrated no acute evidence of any life-threatening risks to your health as a result of your headache. You received medication in the Emergency Department for your headache.     YOUR CT SCAN DEMONSTRATES: AN ARACHNOID CYST. PLEASE FOLLOW UP WITH NEUROLOGY.     While emergent evaluation does not demonstrate any immediate life-threats, we strongly recommend you follow up with a Neurologist for further evaluation of your headache symptoms.     Should you develop nausea, vomiting, worsening headaches, inability to walk properly, numbness or tingling in the extremities, dizziness, or changes to your hearing/vision - please immediately return to the Emergency Department for evaluation of your symptoms.     If you do not have a Neurologist, you can call the following number to schedule an appointment with our Neurology partners:    [facility]  Neurology  [address] [place] [address] [place] [address]  Phone: [phone]    Furthermore we recommend you see your Primary Care Physician for a comprehensive evaluation of your health within the next 48-72 hours.

## 2020-08-27 NOTE — ED PROVIDER NOTE - CLINICAL SUMMARY MEDICAL DECISION MAKING FREE TEXT BOX
Exam, presentation, and history concerning for SAH vs. Complex Migraine vs. Cluster headache - minimal concern for CVA, Meningitis/Encephalitis. Plan: CBC, CMP, EKG, CT Head (within 6 hour window), Reglan, IVF.

## 2020-08-27 NOTE — ED PROVIDER NOTE - OBJECTIVE STATEMENT
25 male, hx: intracranial cyst - presents with sudden onset headache that began 1 hour prior; reports he was driving when all of a sudden he developed a R sided headache, with blurry vision, nausea, and vomiting. Reports prior history of migraines, however this presentation is different. Denies any fevers, chills, CP, SOB, neck pain/stiffness, rashes, abdominal pain, dysuric symptoms.

## 2020-08-27 NOTE — ED ADULT NURSE NOTE - NSFALLRSKUNASSIST_ED_ALL_ED
----- Message from Saleem May sent at 9/19/2019  4:21 PM EDT -----  Please schedule with Dr Denis Cast  Thank you,  Madonna Reeves  ----- Message -----  From: Kiesha Webber MD  Sent: 9/18/2019  11:22 AM EDT  To: Ashley Ash,  Patient of Dr Jayro Putnam with refractory ulcerative colitis on chronic steroid therapy still with symptoms   Please schedule OV with Dr Denis Cast ASAP  Thank you  Dr Bella Murrell no

## 2020-08-27 NOTE — ED PROVIDER NOTE - ATTENDING CONTRIBUTION TO CARE
pt w hx intracranial cyst here with sudden onset HA 1 hr PTA, severe in nature, along with n/v, located behind R eye, slight blurry vision. denies weakness/numbness. on exam, pt is neuro intact, PERRL, normal vision. CT head neg for acute bleed - given acute onset and less than 2 hrs from onset to CT, sensitivity of  scan is adequate to r/o bleed. will tx pt supportively for acute HA. no signs of infection/meningitis. once pt feels better, he should be stable to d/c with f/u prn for further eval of his IC cyst.

## 2020-08-27 NOTE — ED PROVIDER NOTE - PHYSICAL EXAMINATION
GEN - NAD; well appearing; A+Ox3   HEAD - NC/AT, No visible Ecchymosis, No Abrasions, No Lacerations/Skin Tears     EYES - EOMI, PERRL no conjunctival pallor, no scleral icterus  PULM - CTA B/L,  symmetric breath sounds  COR -  RRR, S1 S2, no murmurs  ABD - NT/ND, soft, no guarding, no rebound, no masses    BACK - no CVA tenderness  EXTREMS - 2+ Pulses B/L UE and LE; 0+ edema, no gross deformity, warm and well perfused, no calf tenderness/swelling/erythema    SKIN - no rash or bruising      NEUROLOGIC - alert, CN2-12 intact, sensation nl, moving all 4 ext

## 2020-08-27 NOTE — ED PROVIDER NOTE - PATIENT PORTAL LINK FT
You can access the FollowMyHealth Patient Portal offered by [facility] by registering at the following website: [email or web address]. By joining [employer]’s FollowMyHealth portal, you will also be able to view your health information using other applications (apps) compatible with our system.

## 2020-08-28 VITALS
OXYGEN SATURATION: 97 % | HEART RATE: 79 BPM | TEMPERATURE: 98 F | RESPIRATION RATE: 20 BRPM | DIASTOLIC BLOOD PRESSURE: 63 MMHG | SYSTOLIC BLOOD PRESSURE: 116 MMHG

## 2020-08-28 LAB
ANION GAP SERPL CALC-SCNC: 13 MMOL/L — SIGNIFICANT CHANGE UP (ref 5–17)
CHLORIDE SERPL-SCNC: 103 MMOL/L — SIGNIFICANT CHANGE UP (ref 96–108)
POTASSIUM SERPL-MCNC: 3.8 MMOL/L — SIGNIFICANT CHANGE UP (ref 3.5–5.3)
POTASSIUM SERPL-SCNC: 3.8 MMOL/L — SIGNIFICANT CHANGE UP (ref 3.5–5.3)
SODIUM SERPL-SCNC: 140 MMOL/L — SIGNIFICANT CHANGE UP (ref 135–145)

## 2022-07-27 PROBLEM — G43.909 MIGRAINE, UNSPECIFIED, NOT INTRACTABLE, WITHOUT STATUS MIGRAINOSUS: Chronic | Status: ACTIVE | Noted: 2017-07-17

## 2022-10-13 NOTE — ED ADULT NURSE NOTE - DISCHARGE DATE/TIME
Prior Authorization Approval    Authorization Effective Date: 7/15/2022  Authorization Expiration Date: 10/13/2023  Medication: econazole nitrate 1 % external cream  Approved Dose/Quantity: 85gm per 30 days  Reference #:     Insurance Company: [employer] - Phone [phone] Fax [phone]  Expected CoPay:       Which Pharmacy is filling the prescription (Not needed for infusion/clinic administered): [id number] ([facility]) - [place], [place] - [facility] [address]  Pharmacy Notified: Yes  Patient Notified: No         21-Mar-2018 16:39

## 2023-08-22 NOTE — ED ADULT TRIAGE NOTE - BP NONINVASIVE DIASTOLIC (MM HG)
A catheter was exchanged for a (CATHETER 6FR PGTL FL4 FR4 CRV [id number] FULL LGTH WIRE BRAID) catheter. pig. [id number]

## 2023-12-22 NOTE — ED PROVIDER NOTE - CROS ED NEURO POS
migraine [FreeTextEntry1] : Further management after results of pelvic ultrasound received,  Referred to breast surgery for surveillance.
